# Patient Record
Sex: FEMALE
[De-identification: names, ages, dates, MRNs, and addresses within clinical notes are randomized per-mention and may not be internally consistent; named-entity substitution may affect disease eponyms.]

---

## 2019-04-25 ENCOUNTER — HOSPITAL ENCOUNTER (OUTPATIENT)
Dept: HOSPITAL 88 - OR | Age: 59
Discharge: HOME | End: 2019-04-25
Attending: INTERNAL MEDICINE
Payer: COMMERCIAL

## 2019-04-25 VITALS — DIASTOLIC BLOOD PRESSURE: 69 MMHG | SYSTOLIC BLOOD PRESSURE: 108 MMHG

## 2019-04-25 DIAGNOSIS — E11.9: ICD-10-CM

## 2019-04-25 DIAGNOSIS — Z09: Primary | ICD-10-CM

## 2019-04-25 DIAGNOSIS — J45.909: ICD-10-CM

## 2019-04-25 DIAGNOSIS — I49.1: ICD-10-CM

## 2019-04-25 DIAGNOSIS — K29.00: ICD-10-CM

## 2019-04-25 DIAGNOSIS — K57.30: ICD-10-CM

## 2019-04-25 DIAGNOSIS — K62.89: ICD-10-CM

## 2019-04-25 DIAGNOSIS — K21.9: ICD-10-CM

## 2019-04-25 DIAGNOSIS — R19.5: ICD-10-CM

## 2019-04-25 DIAGNOSIS — I10: ICD-10-CM

## 2019-04-25 DIAGNOSIS — Z01.810: ICD-10-CM

## 2019-04-25 DIAGNOSIS — G47.33: ICD-10-CM

## 2019-04-25 DIAGNOSIS — F41.9: ICD-10-CM

## 2019-04-25 DIAGNOSIS — K59.00: ICD-10-CM

## 2019-04-25 PROCEDURE — 93005 ELECTROCARDIOGRAM TRACING: CPT

## 2019-04-25 PROCEDURE — 45378 DIAGNOSTIC COLONOSCOPY: CPT

## 2019-04-25 PROCEDURE — 36415 COLL VENOUS BLD VENIPUNCTURE: CPT

## 2019-04-25 PROCEDURE — 82948 REAGENT STRIP/BLOOD GLUCOSE: CPT

## 2019-04-25 NOTE — XMS REPORT
Patient Summary Document

                             Created on: 2019



CAMILLA PRINCESS

External Reference #: [id number]

: 1960

Sex: Female



Demographics







                          Address                   [address] [place]  [address]

 

                          Home Phone                [phone]

 

                          Preferred Language        Unknown

 

                          Marital Status            Unknown

 

                          Gnosticism Affiliation     Unknown

 

                          Race                      Unknown

 

                          Ethnic Group              Unknown





Author







                          Author                    [facility]

 

                          Address                   Unknown

 

                          Phone                     Unavailable







Care Team Providers







                    Care Team Member Name    Role                Phone

 

                          Unavailable               Unavailable







Problems

This patient has no known problems.



Allergies, Adverse Reactions, Alerts

This patient has no known allergies or adverse reactions.



Medications

This patient has no known medications.



Encounters







             Start Date/Time    End Date/Time    Encounter Type    Admission Type    Attending [facility]       Care Department     Encounter ID

 

        2018 00:00:00    2018 00:00:00    Outpatient                    [facility]     [id number]

 

        2018 00:00:00    2018 00:00:00    Outpatient                    [facility]     [id number]

 

        2018 00:00:00    2018 00:00:00    Outpatient                    [facility]     [id number]

 

        2018 00:00:00    2018 00:00:00    Outpatient                    [facility]     [id number]

 

        2018 14:57:47    2018 14:57:47    Emergency                    [facility]     [id number]

 

        2018 11:08:54    2018 11:08:54    Outpatient                    [facility]     [id number]

 

        2018 13:49:40    2018 13:49:40    Outpatient                    [facility]     [id number]

 

        2018 00:00:00    2018 00:00:00    Outpatient                    [facility]     [id number]

 

        2018 06:58:38    2018 06:58:38    Outpatient                    [facility]     [id number]

 

        2018-05-10 00:00:00    2018-05-10 00:00:00    Outpatient                    [facility]     [id number]

 

        2018 08:39:01    2018 08:39:01    Outpatient                    [facility]     [id number]

 

        2018 10:58:25    2018 10:58:25    Outpatient                    [facility]     [id number]

 

        2018 08:20:46    2018 08:20:46    Outpatient                    [facility]     [id number]

 

        2018 09:27:31    2018 09:27:31    Outpatient                    [facility]     [id number]

 

        2018 00:00:00    2018 00:00:00    Outpatient                    [facility]     [id number]

 

        2018 00:00:00    2018 00:00:00    Outpatient                    [facility]     [id number]

 

        2018 00:00:00    2018 00:00:00    Outpatient                    [facility]     [id number]

 

        2018 00:00:00    2018 00:00:00    Outpatient                    [facility]     [id number]

 

        2018 08:15:09    2018 08:15:09    Outpatient                    [facility]     [id number]

 

        2017 08:04:05    2017 08:04:05    Outpatient                    [facility]     [id number]

 

        2017 10:14:58    2017 10:14:58    Outpatient                    [facility]     [id number]

 

        2017 10:08:29    2017 10:08:29    Outpatient                    [facility]     [id number]

 

        2017 09:35:05    2017 09:35:05    Outpatient                    [facility]     [id number]

 

        2017 12:37:12    2017 12:37:12    Outpatient                    [facility]     [id number]

 

        2017 00:00:00    2017 00:00:00    Outpatient                    [facility]     [id number]

 

        2017 13:58:11    2017 13:58:11    Outpatient                    [facility]     [id number]

 

        2017 07:48:51    2017 07:48:51    Outpatient                    [facility]     [id number]

 

        2017 07:46:29    2017 07:46:29    Outpatient                    [facility]     [id number]

 

        2017 15:12:11    2017 15:12:11    Outpatient                    [facility]     [id number]

 

        2017 07:50:31    2017 07:50:31    Outpatient                    [facility]     [id number]

 

        2017 11:03:16    2017 11:03:16    Outpatient                    [facility]     [id number]

 

        2017 12:04:58    2017 12:04:58    Outpatient                    [facility]     [id number]

 

        2017 13:49:27    2017 13:49:27    Outpatient                    [facility]     [id number]

 

        2017 00:00:00    2017 00:00:00    Outpatient                    [facility]     [id number]

 

        2017-11-15 14:55:00    2017-11-15 14:55:00    Outpatient                    [facility]     [id number]

 

        2017 09:40:28    2017 09:40:28    Outpatient                    [facility]     [id number]

 

        2017 09:47:54    2017 09:47:54    Outpatient                    [facility]     [id number]

 

        2017 08:56:29    2017 08:56:29    Outpatient                    [facility]     [id number]

 

        2017 12:00:58    2017 12:00:58    Outpatient                    [facility]     [id number]

 

        2017 10:24:58    2017 10:24:58    Outpatient                    [facility]     [id number]

 

        2017 10:44:49    2017 10:44:49    Outpatient                    [facility]     [id number]

 

        2017 00:00:00    2017 00:00:00    Outpatient                    [facility]     [id number]

 

        2017 00:00:00    2017 00:00:00    Outpatient                    [facility]     [id number]

 

        2017-10-26 13:12:29    2017-10-26 13:12:29    Outpatient                    [facility]     [id number]

 

        2017-10-26 13:12:08    2017-10-26 13:12:08    Outpatient                    [facility]     [id number]

 

        2017-10-26 10:00:00    2017-10-26 10:00:00    Outpatient                    [facility]     [id number]

 

        2017-10-26 07:37:31    2017-10-26 07:37:31    Outpatient                    [facility]     [id number]

 

        2017 12:08:04    2017 12:08:04    Outpatient                    [facility]     [id number]

 

        2017 09:00:14    2017 09:00:14    Outpatient                    [facility]     [id number]

 

        2017 08:54:16    2017 08:54:16    Outpatient                    [facility]     [id number]

 

        2017 09:53:43    2017 09:53:43    Outpatient                    [facility]     [id number]

 

        2017 09:52:16    2017 09:52:16    Outpatient                    [facility]     [id number]

 

        2017 22:03:21    2017 22:03:21    Emergency                    [facility]     [id number]

 

        2017 21:05:05    2017 21:05:05    Emergency                    [facility]     [id number]

 

        2017 19:48:30    2017 19:48:30    Emergency                    [facility]     [id number]

 

        2017 11:36:57    2017 11:36:57    Emergency                    [facility]     [id number]

 

        2017 11:26:15    2017 11:26:15    Emergency                    [facility]     [id number]

 

        2017 11:26:13    2017 11:26:13    Emergency                    [facility]     [id number]

 

        2017 11:26:10    2017 11:26:10    Emergency                    [facility]     [id number]

 

        2017 11:21:02    2017 11:21:02    Emergency                    [facility]     [id number]

## 2019-04-25 NOTE — XMS REPORT
Clinical Summary

                             Created on: 2019



Sobia Abdalla

External Reference #: [id number]

: 1960

Sex: Female



Demographics







                          Address                   [address] [place]  [address]

 

                          Home Phone                [phone]

 

                          Preferred Language        English

 

                          Marital Status            

 

                          Anglican Affiliation     [id number]

 

                          Race                      White

 

                          Ethnic Group              /Latin





Author







                          Author                    [facility]

 

                          Organization              [facility]

 

                          Address                   Unknown

 

                          Phone                     Unavailable







Support







                Name            Relationship    Address         Phone

 

                Luke Abdalla            Unknown         [phone]







Care Team Providers







                    Care Team Member Name    Role                Phone

 

                    Mago Figueroa MD    PCP                 Unavailable







Allergies







                                        Comments



                 Active Allergy     Reactions       Severity        Noted Date 

 

                                         



                     No Known Drug Allergies     Other (See          2016 



                                         Comments)   







Medications







                          End Date                  Status



              Medication     Sig          Dispensed     Refills      Start  



                                         Date  

 

                                                    Active



                     budesonide-formoterol     Inhale 2            0   



                           (SYMBICORT) 160-4.5       puffs 2 (two)     



                           mcg/actuation inhaler     times a day.     

 

                                                    Active



                     azelastine-fluticasone     1 spray by          0   



                           (DYMISTA) 137-50          Each Nare     



                           mcg/spray                 route 2 (two)     



                           spray,non-aerosol         times a day.     

 

                                                    Active



                     metFORMIN (GLUCOPHAGE)     Take 500 mg         0   



                           500 mg tablet             by mouth 2     



                                         (two) times a     



                                         day with     



                                         meals.     

 

                                                    Active



                     aspirin (ECOTRIN) 81 MG     Take 81 mg by       0   



                           enteric coated tablet     mouth daily.     

 

                                                    Active



                     lansoprazole (PREVACID)     Take 30 mg by       0   



                           30 MG capsule             mouth daily.     

 

                                                    Active



                     bisoprolol-hydrochlorothi       1                     



                           azide (ZIAC) 10-6.25 mg        9  



                                         per tablet      

 

                                                    Active



                     atorvastatin (LIPITOR) 20       0                     



                           MG tablet                 9  

 

                                                    Active



                     hyoscyamine sulfate 0.125     Take by             0   



                           mg tablet,disintegrating     mouth.     

 

                                                    Active



                     montelukast (SINGULAIR)       0                     



                           10 mg tablet              9  

 

                                                    Active



                     albuterol sulfate (PROAIR     Inhale.             0   



                                         HFA INHL)      

 

                          2019                Discontinued



                     bisoprolol-hydrochlorothi     Take 1 tablet       0   



                           azide (ZIAC) 2.5-6.25 mg     by mouth     



                           per tablet                daily.     

 

                          2019                



              cephalexin (KEFLEX) 500     Take 1       14 capsule     0              



                     MG capsule          capsule (500        9  



                                         mg total) by     



                                         mouth 2 (two)     



                                         times a day     



                                         for 7 days.     

 

                          2019                



              phenazopyridine     Take 1 tablet     10 tablet     0              



                     (PYRIDIUM) 100 MG tablet     (100 mg             9  



                                         total) by     



                                         mouth 3     



                                         (three) times     



                                         a day as     



                                         needed for     



                                         bladder     



                                         spasms for up     



                                         to 3 days.     

 

                          2019                



              nitrofurantoin,     Take 1       10 capsule     0              



                     macrocrystal-monohydrate,     capsule (100        9  



                           (MACROBID) 100 MG capsule     mg total) by     



                                         mouth 2 (two)     



                                         times a day     



                                         for 5 days.     







Active Problems







 



                           Problem                   Noted Date

 

 



                           Acute bronchitis          2016

 

 



                           Acute conjunctivitis      2016

 

 



                           Acute contact dermatitis     2016

 

 



                           Acute cystitis            2016

 

 



                           Acute ethmoidal sinusitis     2016

 

 



                           Acute lymphadenitis       2016

 

 



                           Allergic bronchitis       2016

 

 



                           Amenorrhea                2016

 

 



                           Anterior knee pain        2016

 

 



                           Bronchitis                2016

 

 



                           Candidal intertrigo       2016

 

 



                           Candidiasis of mouth      2016

 

 



                           Central obesity           2016

 

 



                           Prolapsed cervical disc     2016

 

 



                                         Overview:



                                         Cervical Disc Prolapse with Myelopathy

 

 



                           Chronic pansinusitis      2016

 

 



                           Closed fracture of phalanx of foot     2016

 

 



                           Condyloma acuminatum      2016

 

 



                           Constitutional aplastic anemia     2016

 

 



                           Contusion of multiple sites     2016

 

 



                           Dehydration               2016

 

 



                           Diabetes mellitus         2016

 

 



                           Dysuria                   2016

 

 



                           Essential hypertension     2016

 

 



                           Gastroesophageal reflux disease     2016

 

 



                           Helicobacter pylori gastrointestinal tract infection     2016

 

 



                           Hypercholesteremia        2016

 

 



                           Hypoglycemia due to endogenous hyperinsulinemia     2016

 

 



                           Hypothyroidism            2016

 

 



                           Lumbar disc disorder with myelopathy     2016

 

 



                           Lumbar radiculopathy      2016

 

 



                           Multiple-type hyperlipidemia     2016

 

 



                           Arthralgia of multiple joints     2016

 

 



                           Adiposity                 2016

 

 



                           Right upper quadrant pain     2016

 

 



                           Spasm of back muscles     2016

 

 



                           Uncontrolled type 2 diabetes mellitus     2016

 

 



                           Viral wart on finger      2016

 

 



                           Vitamin B deficiency      2016

 

 



                           Vitamin D deficiency      2016







Encounters







                          Care Team                 Description



                     Date                Type                Specialty  

 

                                        



Shanda Howell MA                            



                     2019          Telephone           Obstetrics and Gynecology  

 

                                        



Ayesha Zayas MD                       



                     2019          Orders Only         Obstetrics and Gynecology  

 

                                        



Ayesha Zayas MD                      Papanicolaou smear for cervical cancer screening (Primary Dx);

 

Visit for screening mammogram; 

Urinary tract infection symptoms



                     2019          Office Visit        Obstetrics and Gynecology  

 

                                        



Adolfo Almaguer MD                    Urinary tract infection without hematuria, site unspecified

 (Primary Dx)



                     2019          Emergency           Emergency Medicine  



after 2018



Immunizations







  



                     Name                Dates Previously Given     Next Due

 

  



                           Influenza Trivalent       2015 

 

  



                           Pneumococcal Conjugate     2015 



                                         13-Valent  

 

  



                           Tdap                      2015 







Family History







   



                 Medical History     Relation        Name            Comments

 

   



                     Heart disease       Brother             tr 

 

   



                     Stroke              Brother             tr 

 

   



                           Heart disease             Maternal  



                                         Grandmother  

 

   



                     Cancer              Sister              noris 

 

   



                     Heart disease       Sister              noris 









   



                 Relation        Name            Status          Comments

 

   



                           Brother                   tr  

 

   



                                         Maternal Grandmother   

 

   



                           Sister                    noris  







Social History







                                        Date



                 Tobacco Use     Types           Packs/Day       Years Used 

 

                                         



                                         Never Smoker    

 

    



                                         Smokeless Tobacco: Never   



                                         Used   









   



                 Alcohol Use     Drinks/Week     oz/Week         Comments

 

   



                           Yes                       socially









 



                           Sex Assigned at Birth     Date Recorded

 

 



                                         Not on file 









                                        Industry



                           Job Start Date            Occupation 

 

                                        Not on file



                           Not on file               Not on file 









                                        Travel End



                           Travel History            Travel Start 

 





                                         No recent travel history available.







Last Filed Vital Signs







                                        Time Taken



                           Vital Sign                Reading 

 

                                        2019 10:11 AM CDT



                           Blood Pressure            112/68 

 

                                        2019 10:11 AM CDT



                           Pulse                     49 

 

                                        2019  8:23 PM CST



                           Temperature               36.4 C (97.5 F) 

 

                                        2019  8:23 PM CST



                           Respiratory Rate          18 

 

                                        2019  8:23 PM CST



                           Oxygen Saturation         98% 

 

                                        -



                           Inhaled Oxygen            - 



                                         Concentration  

 

                                        2019 10:11 AM CDT



                           Weight                    73.5 kg (162 lb) 

 

                                        2019 10:11 AM CDT



                           Height                    149.9 cm (4' 11") 

 

                                        2019 10:11 AM CDT



                           Body Mass Index           32.72 







Plan of Treatment







                          Care Team                 Description



                     Date                Type                Specialty  

 

                                        



Janine Rosario MD



[address] [facility]



[address] [place] [address] [phone] (Fax)                       



                     2019          Office Visit        Urogynecology  









   



                 Health Maintenance     Due Date        Last Done       Comments

 

   



                           DIABETIC RETINAL EYE EXAM     1960  

 

   



                           DIABETIC FOOT EXAM        1971  

 

   



                           URINE MICROALBUMIN        1971  

 

   



                           CERVICAL CANCER SCREENING     1982  

 

   



                           BREAST CANCER SCREENING     2011  

 

   



                           COLON CANCER SCREENING     2011  

 

   



                           SHINGLES VACCINES (#1)     2011  

 

   



                     INFLUENZA VACCINE     2019 







Procedures







                                        Comments



                 Procedure Name     Priority        Date/Time       Associated Diagnosis 

 

                                        



Results for this procedure are in the results section.



                 THINPREP TIS PAP RFX HPV     Routine         2019      Papanicolaou smear for 



                           10:54 AM CDT              cervical cancer screening 

 

                                        



Results for this procedure are in the results section.



                 URINALYSIS, AUTOMATED     Routine         2019      Urinary tract infection 



                     WITH MICROSCOPY      10:54 AM CDT        symptoms 

 

                                        



Results for this procedure are in the results section.



                 URINE CULTURE     Routine         2019      Urinary tract infection 



                           10:54 AM CDT              symptoms 

 

                                        



Results for this procedure are in the results section.



                     GRAM STAIN          Routine             2019  



                                         8:01 PM CST  

 

                                        



Results for this procedure are in the results section.



                     URINE CULTURE       Routine             2019  



                                         8:01 PM CST  

 

                                        



Results for this procedure are in the results section.



                     URINALYSIS SCREEN AND     Routine             2019  



                           MICROSCOPY, WITH REFLEX      7:43 PM CST  



                                         TO CULTURE    



after 2018



Results

* THINPREP TIS PAP RFX HPV (2019 10:54 AM CDT)





   



                 Component       Value           Ref Range       Performed At

 

   



                     Clinical information     None given          [facility]



                                         [facility]



                                         II

 

   



                     Date of last menstrual     NONE GIVEN          [facility]



                           period                    [facility]



                                         II

 

   



                     Prev. pap:          NONE GIVEN          [facility]



                                         [facility]



                                         II

 

   



                     Prev. bx:           NONE GIVEN          [facility]



                                         [facility]



                                         II

 

   



                     Source              None given          [facility]



                                         [facility]



                                         II

 

   



                     Statement of adequacy     Comment: SATISFACTORY FOR      [facility]



                           EVALUATION                [facility]



                                         II

 

   



                     Interpretation/result:     Comment: Negative for      [facility]



                           intraepithelial lesion or      [facility]



                           malignancy.               II

 

   



                     Comment             Comment:            [facility]



                           This Pap test has been      [facility]



                           evaluated with computer      II



                                         assisted technology.  

 

   



                     Cytotechnologist     Comment:            [facility]



                           JLB, CT(ASCP)             [facility]



                           CT screening location: [facility]



                                         [address]  



                                         [address]  

 

   



                     Comment             Comment:            [facility]



                           EXPLANATORY NOTE:         [facility]



                           The Pap is a screening test      II



                                         for cervical cancer. It is  



                                         not a diagnostic test and is  



                                         subject to false negative  



                                         and false positive results. It  



                                         is most reliable when a  



                                         satisfactory sample, regularly  



                                         obtained, is submitted  



                                         with relevant clinical  



                                         findings and history, and when

  



                                         the Pap result is evaluated  



                                         along with historic and  



                                         current clinical information.  













                                         Specimen

 





                                         Cervical









                                        Resulting Agency Comment

 

                                        



Performing Organization Information:



Site ID: [id number]



Name: [facility]



Address: [address] [place] [id number]



Director: Dr. Jostin Brambila









   



                 Performing Organization     Address         City/[facility]/Zipcode     Phone Number

 

   



                                         [facility]   

 

   



                 [facility]     [address] [place] [address] [phone]



                                         II   





* Urinalysis, automated with microscopy (2019 10:54 AM CDT)





   



                 Component       Value           Ref Range       Performed At

 

   



                 Color, UA       YELLOW          YELLOW          "[facility]



                                         [place]

 

   



                 Appearance      CLEAR           CLEAR           [facility]



                                         [place]

 

   



                 Specific gravity, urine     1.014           1.001 - 1.035     [facility]



                                         [place]

 

   



                 pH, urine       7.5             5.0 - 8.0       [facility]



                                         [place]

 

   



                 Glucose, urine     NEGATIVE        NEGATIVE        [facility]



                                         [place]

 

   



                 Bilirubin, UA     NEGATIVE        NEGATIVE        [facility]



                                         [place]

 

   



                 Ketones, UA     NEGATIVE        NEGATIVE        [facility]



                                         [place]

 

   



                 Occult blood, urine     NEGATIVE        NEGATIVE        [facility]



                                         [place]

 

   



                 Protein, UA     NEGATIVE        NEGATIVE        [facility]



                                         [place]

 

   



                 Nitrite, UA     NEGATIVE        NEGATIVE        [facility]



                                         [place]

 

   



                 Leukocyte esterase, UA     TRACE (A)       NEGATIVE        [facility]



                                         [place]

 

   



                 WBC, UA         0-5             < OR=5 /HPF     [facility]



                                         [place]

 

   



                 RBC, UA         NONE SEEN       < OR=2 /HPF     [facility]



                                         [place]

 

   



                 Squamous epithelial     0-5             < OR=5 /HPF     [facility]



                           cells, UA                 [place]

 

   



                 Bacteria, UA     MANY (A)        NONE SEEN /HPF     [facility]



                                         [place]

 

   



                 Hyaline casts, UA     NONE SEEN       NONE SEEN /LPF     "[facility]



                                         [place]













                                         Specimen

 





                                         Urine









                                        Resulting Agency Comment

 

                                        



Performing Organization Information:



Site ID: [id number]



Name: [facility]



Address: [address] [place] [id number]



Director: Riya Rucker









   



                 Performing Organization     Address         City/[facility]/Zipcode     Phone Number

 

   



                                         [facility] [address] [address] [phone]







* Urine culture (2019 10:54 AM CDT)



Only the most recent of 2 results within the time period is included.





   



                 Component       Value           Ref Range       Performed At

 

   



                     Urine culture       SEE NOTE (A)        "[facility]



                           Comment:                  [place]



                                         CULTURE, URINE, ROUTINE  



                                           



                                         MICRO  



                                         NUMBER:[id number]  



                                         TEST STATUS:  



                                         FINAL  



                                         SPECIMEN SOURCE: URINE  



                                         SPECIMEN  



                                         QUALITY:ADEQUATE  



                                         RESULT:  



                                         10,000-50,000 CFU/mL of  



                                         Enterococcus faecalis  



                                         COMMENT:  



                                          Additional organism(s) less  



                                         than 10,000 CFU/mL  



                                           



                                          isolated. These  



                                         organisms, commonly found on  



                                           



                                          external and  



                                         internal genitalia, are  



                                         considered  



                                           



                                          colonizers. No  



                                         further testing performed.  



                                           



                                         E.faecal  



                                         is  



                                           



                                         --------  



                                         --------  



                                           



                                         INT  



                                         BOBY  



                                           



                                         AMPICILLIN  



                                          S <=2  



                                           



                                         CIPROFLOXACIN  



                                         S 1  



                                           



                                         LEVOFLOXACIN  



                                          S 1  



                                           



                                         NITROFURANTOIN  



                                         S <=16  



                                           



                                         TETRACYCLINE  



                                          R >=16  



                                           



                                         VANCOMYCIN  



                                          S 1  



                                         S=SusceptibleI=Intermediat  



                                         eR=Resistant*=Not  



                                         Tested  



                                         NR=Not Reported**NN=See  



                                         Therapy Comments  













                                         Specimen

 





                                         Urine









                                        Resulting Agency Comment

 

                                        



Performing Organization Information:



Site ID: [id number]



Name: [facility]



Address: [address] [place] [id number]



Director: Riya Rucker









   



                 Performing Organization     Address         City/State/Zipcode     Phone Number

 

   



                                         [facility] [place] [id number] [phone]







* Gram stain (2019  8:01 PM CST)





   



                 Component       Value           Ref Range       Performed At

 

   



                     Gram stain result     Moderate WBC's      [facility]



                           No organisms seen         HOSPITAL



                                         Comment:  



                                         Specimen Information  



                                         Specimen Source: Urine  



                                         Specimen Site: Clean catch  













                                         Specimen

 





                                         Urine









   



                 Performing Organization     Address         City/State/Zipcode     Phone Number

 

   



                     [facility] DEPARTMENT OF     [address] [place] [address] 



                                         PATHOLOGY AND GENOMIC   



                                         MEDICINE   

 

   



                     [place] [address] 



                                         HOSPITAL   





* Urinalysis screen and microscopy, with reflex to culture (2019  7:43 PM 
  CST)





   



                 Component       Value           Ref Range       Performed At

 

   



                     Specimen site       Clean catch         [facility]

 

   



                     Color, UA           Yellow              [facility]

 

   



                     Appearance, UA      Slightly-Cloudy      [facility]

 

   



                 Specific gravity, UA     1.025           1.001 - 1.035     [facility]

 

   



                 pH, UA          5.0             5.0 - 8.5       [facility]

 

   



                 Protein, UA     Negative        Negative        [facility]

 

   



                 Glucose, UA     Negative        Negative        [facility]

 

   



                 Ketones, UA     Negative        Negative        [facility]

 

   



                 Bilirubin, UA     Negative        Negative        [facility]

 

   



                 Blood, UA       Moderate (A)     Negative        [facility]

 

   



                 Nitrite, UA     Negative        Negative        [facility]

 

   



                 Urobilinogen, UA     Negative        <2.0            [facility]

 

   



                 Leukocyte esterase, UA     Large (A)       Negative        [facility]

 

   



                 Epithelial cells, UA     Many            /HPF            [facility]

 

   



                 WBC, UA         >200 (H)        0 - 5 /HPF      [facility]

 

   



                 RBC, UA         44 (H)          0 - 5 /HPF      [facility]

 

   



                 Bacteria, UA     Trace           None seen       [facility]

 

   



                     Yeast, UA           None seen           [facility]

 

   



                     Yeast with pseudohyphae,     None seen           [facility]













                                         Specimen

 





                                         Urine









   



                 Performing Organization     Address         City/State/Zipcode     Phone Number

 

   



                     [facility]     [address] [place] [address] 



                                         PATHOLOGY AND GENOMIC   



                                         MEDICINE   

 

   



                     [place] [id number]      [place], [place] [id number] [place] [address]   





after 2018



Insurance







     



            Payer      Benefit     Subscriber ID     Type       Phone      Address



                                         Plan /    



                                         Group    

 

     



                 [employer]            [employer]            xxxxxxxxxxxx     PPO  



                                         CHOICE    



                                         PPO/[employer] PPO    









     



            Guarantor Name     Account     Relation to     Date of     Phone      Billing Address



                     Type                Patient             Birth  

 

     



            Sobia Abdalla     Personal/F     Self       1960     [phone]     [address]               ([place])              [place] [address]







Advance Directives





Patient has advance care planning documents on file. For more information, pleas
e contact:



[facility]



[address] [place] [address]

## 2019-04-25 NOTE — XMS REPORT
[facility] Summary

                             Created on: 2019



Ramón Abdalla

External Reference #: [id number]

: 1960

Sex: Female



Demographics







                          Address                   [address] [place]  [address]

 

                          Home Phone                [email or web address]

 

                          Preferred Language        English

 

                          Marital Status            M

 

                          [employer] Affiliation     Unknown

 

                          Race                      Unknown

 

                          Ethnic Group              Unknown





Author







                          Author                    Admin, [place]

 

                          Organization              [facility]

 

                          Address                   [address]  [address]



 

                          Phone                     [phone]







Allergies, Adverse Reactions, Alerts







           Allergy Name    Reaction Description    Start Date    Severity    Status     Provider

 

           No Known Allergies                                         Caro Rosario MA







Conditions or Problems







        Problem Name    Problem Code    Onset Date    Status    Entry Date    Provider    Comment    Standard

 Description                            Annotate

 

           Allergic rhinitis    477.9          Active         Mago Figueroa MD 

                                        Allergic rhinitis, cause unspecified     

 

        Arthralgia    719.40        Active        Mago Figueroa MD            Pain

 in joint, site unspecified              

 

           Bronchitis, acute    466.0          Active         Mago Figueroa MD 

                                        Acute bronchitis     

 

           Hemoccult positive stool    578.1          Active         Mago Figueroa MD                                     Blood in stool       

 

           Anemia, iron deficiency    280.9          Active         Mago Figueroa MD                                     Iron deficiency anemia, unspecified     

 

           Colon Cancer Screening    V76.51         Active         Mago Figueroa MD                                     Screening for malignant neoplasms of colon     

 

                                        Contact with and (suspected) exposure to infections with a predominantly sexual 

mode of transmission    V01.6            Active           Mago Figueroa MD

                                        Contact with or exposure to venereal diseases     

 

        Dehydration    276.50        Active        Mago Figueroa MD            Volume

 depletion, unspecified                  

 

                Diabetes mellitus uncomplicated type II uncontrolled    250.02                Active

                      Mago Figueroa MD                    Diabetes mellitus without mention of complication,

 type II or unspecified type, uncontrolled     

 

        Dysuria    788.1        Active        Mago Figueroa MD            Dysuria

                                         

 

          Hyperlipidemia    272.4         Active        Mago Figueroa MD      

                          Other and unspecified hyperlipidemia     

 

          Hypothyroidism    244.9         Active        Mago Figueroa MD      

                          Unspecified hypothyroidism     

 

           Acute ethmoidal sinusitis    461.2          Active         Mago Figueroa MD                                     Acute ethmoidal sinusitis     

 

        Obesity    278.00        Active        Rafita Read MD            Obesity, unspecified

                                         

 

        Asthma    493.90            Active        Rafita Read MD            Asthma, unspecified    

 

 

        Diabetes - type II    250.00            Active        Rafita Read MD            Diabetes 

mellitus without mention of complication, type II or unspecified type, not 
stated as uncontrolled                   

 

        High cholesterol    272.4            Active        Rafita Read MD            Other and unspecified

 hyperlipidemia                          

 

        Hypertension    401.9            Active        Rafita Read MD            Unspecified essential

 hypertension                            

 

        Sleep apnea    780.57            Active        Rafita Read MD            Unspecified sleep

 apnea                                   

 

                          Encounter for general adult medical examination without abnormal findings    V70.0

               Inactive        Mago Figueroa MD               Routine general medical

 examination at a health care facility     

 

                          Encounter for general adult medical examination without abnormal findings    ICD-V70.0

               Inactive    Mago Figueroa MD         







Medication List







        Medication    Instructions    Start Date    Stop Date    Generic Name    NDC     Status    Provider

                                        Patient Instruction

 

             ATORVASTATIN CALCIUM 20 MG ORAL TABLET    1 By Mouth Every pm                     ATORVASTATIN

 CALCIUM     04933806630    Active       Mago Figueroa MD                 Active

 

           BENZONATATE 200 MG ORAL CAPSULE    1 By Mouth TID                   BENZONATATE    [id number]

                Active          Mago Figueroa MD                    Active

 

             CETIRIZINE HCL 10 MG ORAL TABLET    1 By Mouth Every pm                     CETIRIZINE

 HCL         99267532833    Active       Mago Figueroa MD                 Active

 

             FLUCONAZOLE 200 MG ORAL TABLET    1 By Mouth Every day                     FLUCONAZOLE

             [id number]    Active       Mago Figueroa MD                 Active

 

                HYOSCYAMINE SULFATE 0.125 MG ORAL TABLET    1 By Mouth Every d prn            

           HYOSCYAMINE SULFATE    [id number]    Active     Mago Figueroa MD               Active

 

             MONTELUKAST SODIUM 10 MG ORAL TABLET    1 By Mouth Every pm                     MONTELUKAST

 SODIUM      [id number]    Active       Mago Figueroa MD                 Active

 

             RANITIDINE  MG ORAL TABLET    1 By Mouth Every 6 prn                     RANITIDINE

 HCL         [id number]    Active       Mago Figueroa MD                 Active

 

                TERCONAZOLE 0.4 % VAGINAL CREAM    intravaginal Every at bedtime x 7          

           TERCONAZOLE    84651580319    Active     Mago Figueroa MD               Active

 

           GLUCOPHAGE 500 MG ORAL TABLET    1/2 tablet bid                   METFORMIN HCL    [id number]

                Active          Mago Figueroa MD                    Active

 

                    BISOPROLOL-HYDROCHLOROTHIAZIDE 10-6.25 MG ORAL TABLET    take one daily for bp    

                      BISOPROLOL-HYDROCHLOROTHIAZIDE    60960642475    Active     Mago Figueroa MD    

                                        Active

 

             CEPHALEXIN 500 MG ORAL CAPSULE    1 By Mouth BID        CEPHALEXIN

 500 MG ORAL CAPSULE    008164              CEPHALEXIN          Inactive

 

             DIFLUCAN 100 MG ORAL TABLET    1 By Mouth qd        DIFLUCAN 100

 MG ORAL TABLET     903682              FLUCONAZOLE         Inactive

 

                METFORMIN  MG ORAL TABLET    1 by mouth twice a day    

                METFORMIN  MG ORAL TABLET    912767          METFORMIN HCL    Inactive

 

                PRAVASTATIN SODIUM 20 MG ORAL TABLET    take one each night    

                PRAVASTATIN SODIUM 20 MG ORAL TABLET    101166          PRAVASTATIN SODIUM    Inactive

 

             CEPHALEXIN 500 MG ORAL CAPSULE    1 By Mouth BID        CEPHALEXIN

             [id number]    No Longer Active    Mago Figueroa MD                 Active

 

             DIFLUCAN 100 MG ORAL TABLET    1 By Mouth qd        FLUCONAZOLE 

             [id number]    No Longer Active    Mago Figueroa MD                 Active

 

                METFORMIN  MG ORAL TABLET    1 by mouth twice a day    

           METFORMIN HCL    17796264885    No Longer Active    Mago Figueroa MD               Active

 

                PRAVASTATIN SODIUM 20 MG ORAL TABLET    take one each night    

           PRAVASTATIN SODIUM    [id number]    No Longer Active    Mago Figueroa MD               Active









Vital Signs







           Date       Name       Value      Unit       Range      Description

 

               blood pressure, diastolic    85         mm[Hg]                BP koo

 

               blood pressure, systolic    121        mm[Hg]                BP sys

 

               height E&M    59         [in_us]               Bdy height

 

               pulse rate E&M    69         /min                  Heart rate

 

               respiratory rate E&M    18         /min                  Resp rate

 

               temperature E&M    98.4       [degF]                Body temperature

 

               weight E&M    166        [lb_av]               Weight Measured

 

               blood pressure, diastolic    86         mm[Hg]                BP koo

 

               blood pressure, systolic    144        mm[Hg]                BP sys

 

               height E&M    59         [in_us]               Bdy height

 

               pulse rate E&M    73         /min                  Heart rate

 

               respiratory rate E&M    18         /min                  Resp rate

 

               temperature E&M    97.9       [degF]                Body temperature

 

               weight E&M    164        [lb_av]               Weight Measured







Diagnostic Results







           Date       Name       Value      Unit       Range      Description

 

                                        Lab Report: CBC With Differential/Platelet, Comp. Metabolic Panel (14),  ... - Urinalysis

 

 

               mucus on urinalysis    Present               Not Estab.     

 

                                        Lab Report: CBC With Differential/Platelet, Comp. Metabolic Panel (14),  ... - Chemistry

 

 

               thyroid stimulating hormone, serum    1.410      u[iU]/mL    0.450-4.500     

 

                                        Lab Report: CBC With Differential/Platelet, Comp. Metabolic Panel (14),  ... - Urinalysis

 

 

               WBC urine on microscopy    0-5 /hpf    {Cells}/[HPF]    0 -  5      

 

                                        Lab Report: CBC With Differential/Platelet, Comp. Metabolic Panel (14),  ... - Chemistry

 

 

               very low density lipoproteins    43         mg/dL      5-40        

 

               hepatitis B surface antigen    Negative               Negative     

 

                                        Lab Report: CBC With Differential/Platelet, Comp. Metabolic Panel (14),  ... - Urinalysis

 

 

               epithelial cells, urine    0-10       /[LPF]     0 - 10      

 

                                        Lab Report: CBC With Differential/Platelet, Comp. Metabolic Panel (14),  ... - Chemistry

 

 

               chloride, serum    105        mmol/L           

 

               urea nitrogen, blood    18         mg/dL      6-24        

 

                                        Lab Report: CBC With Differential/Platelet, Comp. Metabolic Panel (14),  ... - Urinalysis

 

 

               leukocyte esterase, urine, by dipstick    Negative               Negative     

 

                                        Lab Report: CBC With Differential/Platelet, Comp. Metabolic Panel (14),  ... - Hematology

 

 

                 mean corpuscular hemoglobin concentration, RBC    34.3 G/DL    %            31.5-35.7

                                         

 

               erythrocyte (RBC) count    4.99 X10E6/UL    10*6/mm3    3.77-5.28     

 

                                        Office Visit: Acute Visit - Urinalysis 

 

               nitrite, urine, semiquantitative    negative                           

 

                                        Lab Report: CBC With Differential/Platelet, Comp. Metabolic Panel (14),  ... - Serology

 

 

               hepatitis C antibody, serum    <0.1                  0.0-0.9     

 

                                        Lab Report: CBC With Differential/Platelet, Comp. Metabolic Panel (14),  ... - Urinalysis

 

 

               urine color    Yellow                Yellow      

 

                                        Lab Report: CBC With Differential/Platelet, Comp. Metabolic Panel (14),  ... - Chemistry

 

 

               Absolute Neutrophils    3.7 X10E3/UL    10*3/uL    1.4-7.0     

 

                                        Lab Report: CBC With Differential/Platelet, Comp. Metabolic Panel (14),  ... - Urinalysis

 

 

               bilirubin, urine    Negative               Negative     

 

                                        Lab Report: CBC With Differential/Platelet, Comp. Metabolic Panel (14),  ... - Chemistry

 

 

               LDL cholesterol, serum    119        mg/dL      0-99        

 

               urea nitrogen/creatinine ratio, serum    29                    9-23        

 

                                        Lab Report: CBC With Differential/Platelet, Comp. Metabolic Panel (14),  ... - Hematology

 

 

               mean corpuscular volume, RBC    88         fL         79-97       

 

                                        Lab Report: CBC With Differential/Platelet, Comp. Metabolic Panel (14),  ... - Chemistry

 

 

               HDL cholesterol, serum    47         mg/dL      >39         

 

                                        Lab Report: CBC With Differential/Platelet, Comp. Metabolic Panel (14),  ... - Hematology

 

 

               monocytes as percent of blood leukocytes    5          %          Not Estab.     

 

                                        Lab Report: CBC With Differential/Platelet, Comp. Metabolic Panel (14),  ... - Chemistry

 

 

               creatinine, serum    0.63       mg/dL      0.57-1.00     

 

               albumin/globulin ratio, serum    1.7                   1.2-2.2     

 

               cholesterol, serum    209        mg/dL      100-199     

 

               creatinine, random, urine    247.8      mg/dL      Not Estab.     

 

               bilirubin, serum, total    1.0        mg/dL      0.0-1.2     

 

                                        Lab Report: CBC With Differential/Platelet, Comp. Metabolic Panel (14),  ... - Hematology

 

 

               Eosinophil Absolute Count    0.1 X10E3/UL    10*3/uL    0.0-0.4     

 

                                        Lab Report: CBC With Differential/Platelet, Comp. Metabolic Panel (14),  ... - Lab

 

 

               chlamydia DNA probe    Negative               Negative     

 

                                        Lab Report: CBC With Differential/Platelet, Comp. Metabolic Panel (14),  ... - Urinalysis

 

 

               appearance, urine    Clear                 Clear       

 

                                        Office Visit: Acute Visit - Urinalysis 

 

               blood in urine (hemoglobin) by dipstick    hemolyzed trace                           

 

                                        Lab Report: CBC With Differential/Platelet, Comp. Metabolic Panel (14),  ... - Chemistry

 

 

               aspartate aminotransferase (SGOT), serum    17         U/L        0-40        

 

                                        Lab Report: CBC With Differential/Platelet, Comp. Metabolic Panel (14),  ... - Hematology

 

 

               red blood cell distribution width    15.2       %          12.3-15.4     

 

                                        Lab Report: CBC With Differential/Platelet, Comp. Metabolic Panel (14),  ... - Urinalysis

 

 

               urinalysis, microscopic examination    MICRON                            

 

                                        Lab Report: CBC With Differential/Platelet, Comp. Metabolic Panel (14),  ... - Hematology

 

 

               leukocyte count, blood    6.7 X10E3/UL    10*3/mm3    3.4-10.8     

 

                                        Lab Report: CBC With Differential/Platelet, Comp. Metabolic Panel (14),  ... - Urinalysis

 

 

               Crystal Type, Urine    Calcium Oxalate               N/A         

 

               pH, urine, semiquantitative    5.5                   5.0-7.5     

 

                                        Lab Report: CBC With Differential/Platelet, Comp. Metabolic Panel (14),  ... - Chemistry

 

 

               potassium, serum    3.8        mmol/L     3.5-5.2     

 

                 immature granulocytes, percentage of total cells, blood    0            %            Not Estab.

                                         

 

               albumin, serum    4.3        g/dL       3.5-5.5     

 

                                        Lab Report: CBC With Differential/Platelet, Comp. Metabolic Panel (14),  ... - Hematology

 

 

                 lymphocyte count, blood, automated    2.6 X10E3/UL    10*3/mm3     0.7-3.1 

                                         

 

               hematocrit, blood    43.7       %          34.0-46.6     

 

                                        Lab Report: CBC With Differential/Platelet, Comp. Metabolic Panel (14),  ... - Microbiology

 

 

               Neisseria gonorrhoeae DNA probe    Negative               Negative     

 

                                        Lab Report: CBC With Differential/Platelet, Comp. Metabolic Panel (14),  ... - Chemistry

 

 

               sodium, serum    142        mmol/L     134-144     

 

                                        Lab Report: CBC With Differential/Platelet, Comp. Metabolic Panel (14),  ... - Hematology

 

 

               neutrophils as percent of blood leukocytes    55         %          Not Estab.     

 

               basophils as percent of blood leukocytes    0          %          Not Estab.     

 

                                        Lab Report: CBC With Differential/Platelet, Comp. Metabolic Panel (14),  ... - Chemistry

 

 

               specific gravity, body fluid    >=1.030               1.005-1.030     

 

               RBC, Urine    0-2 /hpf    /[HPF]     0 -  2      

 

                                        Lab Report: CBC With Differential/Platelet, Comp. Metabolic Panel (14),  ... - Urinalysis

 

 

                 protein, urine, semiquantitative (dipstick)    Negative                  Negative/Trace

                                         

 

                                        Lab Report: CBC With Differential/Platelet, Comp. Metabolic Panel (14),  ... - Serology

 

 

               rapid plasma reagin antibody, serum    Non Reactive               Non Reactive    

 

 

                                        Lab Report: CBC With Differential/Platelet, Comp. Metabolic Panel (14),  ... - Microbiology

 

 

               microscopic exam    See below:                           

 

                                        Lab Report: CBC With Differential/Platelet, Comp. Metabolic Panel (14),  ... - Chemistry

 

 

               carbon dioxide, venous blood    24         mmol/L     20-29       

 

               nitrate, urine    Negative               Negative     

 

               triglyceride, serum, fasting    215        mg/dL      0-149       

 

               calcium, serum    9.5        mg/dL      8.7-10.2     

 

                 microalbumin/creatinine ratio, urine    5.9 MG/G CREAT    ug/mg        0.0-30.0

                                         

 

               alanine aminotransferase (SGPT), serum    16         U/L        0-32        

 

                                        Lab Report: CBC With Differential/Platelet, Comp. Metabolic Panel (14),  ... - Hematology

 

 

               mean corpuscular hemoglobin, RBC    30.1       pg         26.6-33.0     

 

                                        Lab Report: CBC With Differential/Platelet, Comp. Metabolic Panel (14),  ... - Urinalysis

 

 

               bacteria, urine microscopy    None seen               None seen/Few     

 

                                        Office Visit: Acute Visit - Urinalysis 

 

               specific gravity, urine    1.020                             

 

                                        Lab Report: CBC With Differential/Platelet, Comp. Metabolic Panel (14),  ... - Chemistry

 

 

               protein, total, serum    6.9        g/dL       6.0-8.5     

 

               alkaline phosphatase, serum    105        U/L              

 

                                        Lab Report: CBC With Differential/Platelet, Comp. Metabolic Panel (14),  ... - Hematology

 

 

               hemoglobin, blood    15.0       g/dL       11.1-15.9     

 

               lymphocytes as percent of blood leukocytes    39         %          Not Estab.     

 

                                        Lab Report: CBC With Differential/Platelet, Comp. Metabolic Panel (14),  ... - Chemistry

 

 

               hemoglobin A1C, blood, as % of total hemoglobin    6.3        %          4.8-5.6     

 

                                        Lab Report: CBC With Differential/Platelet, Comp. Metabolic Panel (14),  ... - Urinalysis

 

 

               glucose, urine, semiquantitative    Negative               Negative     

 

                                        Lab Report: CBC With Differential/Platelet, Comp. Metabolic Panel (14),  ... - Genetics/fertility

 

 

               eGFR if African American    115        mL/min/1.73m2    >59         

 

                                        Lab Report: CBC With Differential/Platelet, Comp. Metabolic Panel (14),  ... - Hematology

 

 

               basophil count, absolute    0.0 x10E3/uL               0.0-0.2     

 

                                        Lab Report: CBC With Differential/Platelet, Comp. Metabolic Panel (14),  ... - Chemistry

 

 

               globulin, serum    2.6                   1.5-4.5     

 

                 Estimated Glomerular Filtration Rate (calc)    100          mL/min/1.73m2    >59

                                         

 

                                        Lab Report: CBC With Differential/Platelet, Comp. Metabolic Panel (14),  ... - Basic

 

 

               Occult Blood, urine    Negative               Negative     

 

                                        Lab Report: CBC With Differential/Platelet, Comp. Metabolic Panel (14),  ... - Urinalysis

 

 

               urine crystals, microscopic    Present    /[HPF]     N/A         

 

               microalbumin/total urine volume    14.6       mg/L       Not Estab.     

 

                                        Lab Report: CBC With Differential/Platelet, Comp. Metabolic Panel (14),  ... - Hematology

 

 

               eosinophils as percent of blood leukocytes    1          %          Not Estab.     

 

                                        Lab Report: CBC With Differential/Platelet, Comp. Metabolic Panel (14),  ... - Chemistry

 

 

               blood glucose, random    92         mg/dL      65-99       

 

                                        Lab Report: CBC With Differential/Platelet, Comp. Metabolic Panel (14),  ... - Urinalysis

 

 

               urobilinogen, urine, semiquantitative (dipstick)    0.2                   0.2-1.0     



 

                                        Lab Report: CBC With Differential/Platelet, Comp. Metabolic Panel (14),  ... - Hematology

 

 

               monocyte count, blood, automated    0.4 X10E3/UL    10*3/uL    0.1-0.9     

 

               platelet count    239 X10E3/UL    10*3/mm3    150-379     

 

                                        Lab Report: CBC With Differential/Platelet, Comp. Metabolic Panel (14),  ... - Urinalysis

 

 

               ketones, urine, by test strip    Negative               Negative     







Encounters







             Date         Encounter    Provider     Code         Facility

 

                     11:13:58 CST    Est Patient Exp Problem - 79239    Mago Figueroa MD 

                          [id number]                 [facility]

 

                 11:03:46 CDT    Est Patient Detailed - 01773    Mago Figueroa MD    [id number]

                                        [facility]

 

                 16:34:52 CDT    New Patient Detailed - 23559    Rafita Read MD    [id number]

                                        [facility]







Procedures







             Code         Procedure Name    Date         Entry Date    Standard Description

 

                    CPT-           Injection, triamcinolone acetonide, (Kenalog) 10 mg     05:18:21

 CST                                       

 

                    CPT-           Injection, methylprednisolone sodium succinate, up to 40 mg    

 05:18:21 CST                              

 

                    CPT-           Injection, ketorolac tromethamine (toradol), per 15 mg     05:18:21

 CST                                       

 

                    CPT-[id number]           Est Patient Well Exam (40 - 64 Yrs) - [id number]     15:07:11 CST 

                                           

 

                CPT-22841       Handling of specimen for transfer     14:52:18 CST    

                                         

 

             CPT-[id number]    Venipuncture     14:52:16 CST         

 

                    CPT-           Injection, triamcinolone acetonide, (Kenalog) 10 mg     21:46:33

 CDT                                       

 

                    CPT-           Injection, methylprednisolone sodium succinate, up to 40 mg    

 21:46:33 CDT                              

 

                CPT-       Injection, ceftriaxone sodium, per 250 mg     21:46:33 CDT

## 2019-04-25 NOTE — OPERATIVE REPORT
DATE OF PROCEDURE:  04/25/2019

 

SURGEON:  Jay Cano MD

 

PROCEDURE PERFORMED:  Colonoscopy.

 

PREOPERATIVE DIAGNOSIS:  History of colon polyps.

 

POSTOPERATIVE DIAGNOSES:  History of colon polyps, none found and diverticulosis present.

 

PREOPERATIVE MEDICATION:  Consisted of MAC anesthesia.

 

PROCEDURE IN DETAIL:  Using an Olympus SurfEasy video colonoscope, it was inserted into the

patient's rectum and advanced without difficulty to the level of cecum.  The colon was

studied from that level back down to the rectum.  No polypoid lesions were found.  Few

scattered diverticula were present at the sigmoid colon only.  As the colonoscope was

drawn from the patient's rectum, the procedure was ended. At conclusion, we have

findings of new colon polyps and diverticulosis as noted on prior exam also. 

 

PLAN:  High fiber diet and repeat the colonoscopy in 5 years looking for any new polyps.

 

 

 

 

______________________________

Jay Cano MD

 

SAF/MODL

D:  04/25/2019 11:46:07

T:  04/25/2019 18:02:44

Job #:  [id number]